# Patient Record
Sex: MALE | Race: WHITE | NOT HISPANIC OR LATINO | URBAN - METROPOLITAN AREA
[De-identification: names, ages, dates, MRNs, and addresses within clinical notes are randomized per-mention and may not be internally consistent; named-entity substitution may affect disease eponyms.]

---

## 2021-03-23 ENCOUNTER — OFFICE VISIT (OUTPATIENT)
Dept: URGENT CARE | Facility: CLINIC | Age: 26
End: 2021-03-23
Payer: COMMERCIAL

## 2021-03-23 VITALS
SYSTOLIC BLOOD PRESSURE: 124 MMHG | HEART RATE: 86 BPM | RESPIRATION RATE: 14 BRPM | WEIGHT: 167 LBS | TEMPERATURE: 98.6 F | OXYGEN SATURATION: 98 % | HEIGHT: 69 IN | DIASTOLIC BLOOD PRESSURE: 82 MMHG | BODY MASS INDEX: 24.73 KG/M2

## 2021-03-23 DIAGNOSIS — B35.4 TINEA CORPORIS: ICD-10-CM

## 2021-03-23 PROCEDURE — 99203 OFFICE O/P NEW LOW 30 MIN: CPT | Performed by: PHYSICIAN ASSISTANT

## 2021-03-23 RX ORDER — LISDEXAMFETAMINE DIMESYLATE 20 MG/1
CAPSULE ORAL
COMMUNITY

## 2021-03-23 RX ORDER — CLOTRIMAZOLE AND BETAMETHASONE DIPROPIONATE 10; .5 MG/ML; MG/ML
LOTION TOPICAL
Qty: 30 ML | Refills: 2 | Status: SHIPPED | OUTPATIENT
Start: 2021-03-23

## 2021-03-23 ASSESSMENT — ENCOUNTER SYMPTOMS
MUSCULOSKELETAL NEGATIVE: 1
ABDOMINAL PAIN: 0
SHORTNESS OF BREATH: 0
EYE REDNESS: 0
VOMITING: 0
CHILLS: 0
DIZZINESS: 0
EYE DISCHARGE: 0
FEVER: 0
DIARRHEA: 0

## 2021-03-23 NOTE — PROGRESS NOTES
"Subjective:      Richard Javier is a 26 y.o. male who presents with Rash (x 1 week on stomach, arms and chest.  He has not used any new detergents, lotions, food, etc..  Hx of Excema. )            Rash  This is a new problem. The current episode started in the past 7 days (1 week). The affected locations include the abdomen, chest and back. The rash is characterized by redness and scaling. It is unknown if there was an exposure to a precipitant. Pertinent negatives include no congestion, diarrhea, fever, shortness of breath or vomiting. Past treatments include nothing. There is no history of allergies, asthma or eczema.     Patient presents to urgent care reporting a rash on his chest, abdomen, and back starting about 1 week and gradually worsening. No itching or pain. No new soaps, lotions, detergents, or medications. He has no known medical problems and doesn't take any regular medications. No known sick contacts.     Review of Systems   Constitutional: Negative for chills and fever.   HENT: Negative for congestion.    Eyes: Negative for discharge and redness.   Respiratory: Negative for shortness of breath.    Cardiovascular: Negative for chest pain.   Gastrointestinal: Negative for abdominal pain, diarrhea and vomiting.   Genitourinary: Negative.    Musculoskeletal: Negative.    Skin: Positive for rash. Negative for itching.   Neurological: Negative for dizziness.        Objective:     /82   Pulse 86   Temp 37 °C (98.6 °F) (Temporal)   Resp 14   Ht 1.753 m (5' 9\")   Wt 75.8 kg (167 lb)   SpO2 98%   BMI 24.66 kg/m²        Physical Exam  Vitals and nursing note reviewed.   Constitutional:       Appearance: Normal appearance. He is well-developed.   HENT:      Head: Normocephalic and atraumatic.      Right Ear: External ear normal.      Left Ear: External ear normal.   Eyes:      Conjunctiva/sclera: Conjunctivae normal.   Cardiovascular:      Rate and Rhythm: Normal rate and regular rhythm. "   Pulmonary:      Effort: Pulmonary effort is normal.   Musculoskeletal:         General: Normal range of motion.      Cervical back: Normal range of motion.   Skin:     General: Skin is warm and dry.             Comments: Multiple raised, erythematous oval-shaped lesions with overlying scaling noted on chest, abdomen, and back.    Neurological:      Mental Status: He is alert and oriented to person, place, and time.   Psychiatric:         Behavior: Behavior normal.            PMH:  has no past medical history on file.  MEDS:   Current Outpatient Medications:   •  Lisdexamfetamine Dimesylate (VYVANSE) 20 MG Cap, Take  by mouth 1 time a day as needed., Disp: , Rfl:   •  clotrimazole-betamethasone (LOTRISONE) 1-0.05 % Lotion, Apply thin film to affected areas 2-3 times daily until resolved., Disp: 30 mL, Rfl: 2  ALLERGIES:   Allergies   Allergen Reactions   • Azithromycin Vomiting   • Cillins [Penicillins] Vomiting     SURGHX: History reviewed. No pertinent surgical history.  SOCHX:  reports that he has never smoked. He has never used smokeless tobacco. He reports current alcohol use of about 2.4 oz of alcohol per week. He reports current drug use. Drugs: Marijuana and Oral.  FH: family history is not on file.     Assessment/Plan:        1. Tinea corporis    - clotrimazole-betamethasone (LOTRISONE) 1-0.05 % Lotion; Apply thin film to affected areas 2-3 times daily until resolved.  Dispense: 30 mL; Refill: 2    Advised patient the rash appears to be fungal in etiology. Encouraged to apply lotrisone cream 2-3 times daily until resolved. Wash clothing, bedding, and bath towels frequently. Keep areas cools, clean, and dry. Monitor closely and RTC or follow up with primary care if symptoms persist/worsen. The patient demonstrated a good understanding and agreed with the treatment plan.